# Patient Record
Sex: FEMALE | Race: WHITE | HISPANIC OR LATINO | URBAN - METROPOLITAN AREA
[De-identification: names, ages, dates, MRNs, and addresses within clinical notes are randomized per-mention and may not be internally consistent; named-entity substitution may affect disease eponyms.]

---

## 2021-11-24 ENCOUNTER — EMERGENCY (EMERGENCY)
Facility: HOSPITAL | Age: 46
LOS: 0 days | Discharge: HOME | End: 2021-11-24
Attending: EMERGENCY MEDICINE | Admitting: EMERGENCY MEDICINE
Payer: MEDICAID

## 2021-11-24 VITALS
SYSTOLIC BLOOD PRESSURE: 133 MMHG | TEMPERATURE: 97 F | DIASTOLIC BLOOD PRESSURE: 68 MMHG | HEART RATE: 72 BPM | RESPIRATION RATE: 18 BRPM | OXYGEN SATURATION: 100 %

## 2021-11-24 DIAGNOSIS — K08.89 OTHER SPECIFIED DISORDERS OF TEETH AND SUPPORTING STRUCTURES: ICD-10-CM

## 2021-11-24 PROCEDURE — 99283 EMERGENCY DEPT VISIT LOW MDM: CPT

## 2021-11-24 RX ORDER — AMOXICILLIN 250 MG/5ML
1 SUSPENSION, RECONSTITUTED, ORAL (ML) ORAL
Qty: 21 | Refills: 0
Start: 2021-11-24 | End: 2021-11-30

## 2021-11-24 RX ORDER — IBUPROFEN 200 MG
1 TABLET ORAL
Qty: 42 | Refills: 0
Start: 2021-11-24 | End: 2021-12-07

## 2021-11-24 NOTE — ED PROVIDER NOTE - ATTENDING CONTRIBUTION TO CARE
45 yo F no pmh presents with left dental pain. States that for the last 2 days she has been having worsening left lower molar pain. no fevers. mild left facial swelling. no bleeding or drainage. no chills. Unable to get apt sooner than february so came in for evaluation.     VITALS:  I have reviewed the initial vital signs.  GENERAL: Well-developed, well-nourished, in no acute distress. Nontoxic.  HEENT: MMM, tolerating oral secretions. No trismus. + tenderness to left lower molar. No abscess. No floor of mouth or submandibular swelling. No tongue elevation.    NECK: supple w FROM.   PULM: Normal effort. No tachypnea or retractions. No stridor.   SKIN: Warm, dry.  NEURO: A&Ox3. Speech clear. CN II-XII intact. No focal deficits.

## 2021-11-24 NOTE — ED ADULT TRIAGE NOTE - CHIEF COMPLAINT QUOTE
Patient complaining of left lower tooth pain that started 2 days ago. Today worsened causing headache and ear pain.

## 2021-11-24 NOTE — ED PROVIDER NOTE - ENMT, MLM
Airway patent, Nasal mucosa clear. Mouth with normal mucosa. Throat has no vesicles, no oropharyngeal exudates and uvula is midline.  (+) tenderness to percussion tooth #18

## 2021-11-24 NOTE — ED PROVIDER NOTE - NSFOLLOWUPCLINICS_GEN_ALL_ED_FT
[facility]  Dental  [address] [place] [id number]  Phone: [phone]  Fax:   Follow Up Time: 1-3 Days

## 2021-11-24 NOTE — ED PROVIDER NOTE - CLINICAL SUMMARY MEDICAL DECISION MAKING FREE TEXT BOX
Patient presents with dental pain. + tenderness to left lower molar. no abscess. protecting airway. abx and pain medication given. Discharged with dental follow up and return precautions.

## 2021-11-24 NOTE — ED PROVIDER NOTE - PATIENT PORTAL LINK FT
You can access the FollowMyHealth Patient Portal offered by [facility] by registering at the following website: [email or web address]. By joining [employer]’s FollowMyHealth portal, you will also be able to view your health information using other applications (apps) compatible with our system.

## 2021-11-26 ENCOUNTER — EMERGENCY (EMERGENCY)
Facility: HOSPITAL | Age: 46
LOS: 0 days | Discharge: HOME | End: 2021-11-26
Attending: STUDENT IN AN ORGANIZED HEALTH CARE EDUCATION/TRAINING PROGRAM | Admitting: STUDENT IN AN ORGANIZED HEALTH CARE EDUCATION/TRAINING PROGRAM
Payer: MEDICAID

## 2021-11-26 VITALS
WEIGHT: 151.9 LBS | HEART RATE: 62 BPM | TEMPERATURE: 96 F | DIASTOLIC BLOOD PRESSURE: 70 MMHG | RESPIRATION RATE: 16 BRPM | OXYGEN SATURATION: 100 % | SYSTOLIC BLOOD PRESSURE: 126 MMHG

## 2021-11-26 DIAGNOSIS — K02.9 DENTAL CARIES, UNSPECIFIED: ICD-10-CM

## 2021-11-26 DIAGNOSIS — K08.89 OTHER SPECIFIED DISORDERS OF TEETH AND SUPPORTING STRUCTURES: ICD-10-CM

## 2021-11-26 PROCEDURE — 99282 EMERGENCY DEPT VISIT SF MDM: CPT

## 2021-11-26 NOTE — CONSULT NOTE ADULT - SUBJECTIVE AND OBJECTIVE BOX
Patient is a 46y old  Female who presents with a chief complaint of pain on #17    HPI: Patient reports pain started 3 days ago, she presented to the emergency department on 11/24/21 and was prescribed Amoxicillin and Ibuprofen. Patient reports pain on percussion and palpation, denies dysphagia and dyspnea.      PAST MEDICAL & SURGICAL HISTORY:    ( -  ) heart valve replacement  ( -  ) joint replacement  ( -  ) pregnancy    Allergies    No Known Allergies    Intolerances      *SOCIAL HISTORY: ( -  ) Tobacco; ( -  ) ETOH    *Last Dental Visit: Unknown    Vital Signs Last 24 Hrs  T(C): 35.8 (26 Nov 2021 09:06), Max: 35.8 (26 Nov 2021 09:06)  T(F): 96.5 (26 Nov 2021 09:06), Max: 96.5 (26 Nov 2021 09:06)  HR: 62 (26 Nov 2021 09:06) (62 - 62)  BP: 126/70 (26 Nov 2021 09:06) (126/70 - 126/70) BP at 11:15 136/69 P 57 T: 96.8  BP(mean): --  RR: 16 (26 Nov 2021 09:06) (16 - 16)  SpO2: 100% (26 Nov 2021 09:06) (100% - 100%)      EOE:  TMJ (-   ) clicks                     ( -  ) pops                     ( -  ) crepitus             Mandible <<FROM>>             Facial bones and MOM <<grossly intact>>             ( -  ) trismus             ( -  ) lymphadenopathy             ( -  ) swelling             ( -  ) asymmetry             ( -  ) palpation             ( -  ) dyspnea             ( -  ) dysphagia             ( -  ) loss of consciousness    IOE:  <<permanent> dentition: <<grossly intact>>            hard/soft palate:  ( -  ) palatal torus, <<No pathology noted>>           tongue/FOM <<No pathology noted>>           labial/buccal mucosa <<No pathology noted>>           ( +  ) percussion           ( +  ) palpation           ( -  ) swelling            ( -  ) abscess           ( -  ) sinus tract      *DENTAL RADIOGRAPHS: 1 periapical      *ASSESSMENT: extensive periapical pathology noted on #17, #17 has class II mobility and is depressible in the socket. Diagnosis: necrotic pulp with symptomatic apical periodontitis.      *PLAN: Extraction of #17    PROCEDURE:   Verbal and written consent given. Treatment consequences explained to the patient as per OS sheet dated 7/13/00, : Sean ID: [id number]. Consent obtained, site side signed.   Anesthesia: <<1 carpule of 2% lidocaine with 1:100,000 epinephrine administered via inferior alveolar nerve block, 2 carpules of 4% Septocaine with 1:100,000 epinephrine administered via local infiltration.      >>   Treatment: <<#17 was elevated and extracted, socket curetted and irrigated with saline. Hemostasis achieved, post operative radiograph taken, post operative instructions given verbally and written. Advised the patient to continue taking the prescribed Amoxicillin.    >>     RECOMMENDATIONS:  1) Continue Amoxicillin and ibuprofen  2) Dental F/U with outpatient dentist for comprehensive dental care.   3) If any difficulty swallowing/breathing, fever occur, return to ER.     Resident Name, pager # Ezequiel Nino DDS, #[phone]

## 2021-11-26 NOTE — ED PROVIDER NOTE - PHYSICAL EXAMINATION
Vital Signs: Reviewed  GEN: alert, NAD, speaks full sentences  HEAD:  normocephalic, atraumatic  EYES:  PERRLA; conjunctivae without injection, drainage or discharge  ENMT:  tooth #18 w/ caries, no apical abscess, no gingival edema/erythema, no tongue/uvula/floor of mouth swelling; nasal mucosa moist; mouth moist without ulcerations or lesions; throat moist without erythema, exudate, ulcerations or lesions  NECK:  supple  CARDIAC:  regular rate, normal S1 and S2, no murmurs  RESP:  respiratory rate and effort appear normal for age; lungs are clear to auscultation bilaterally; no rales or wheezes  ABDOMEN:  soft, nontender, nondistended  MUSCULOSKELETAL/NEURO:  normal movement, normal tone  SKIN:  normal skin color for age and race, well-perfused; warm and dry

## 2021-11-26 NOTE — ED PROVIDER NOTE - OBJECTIVE STATEMENT
46yF no pmhx c/o tooth pain x3days; constant, stable; states pain started w/o known provoking factor, exacerbated by eating but is tolerating food/water/secretions, denies fever/chills/abd pain/n/v/d.

## 2022-01-14 ENCOUNTER — EMERGENCY (EMERGENCY)
Facility: HOSPITAL | Age: 47
LOS: 0 days | Discharge: HOME | End: 2022-01-14
Attending: EMERGENCY MEDICINE | Admitting: EMERGENCY MEDICINE
Payer: MEDICAID

## 2022-01-14 VITALS
TEMPERATURE: 97 F | SYSTOLIC BLOOD PRESSURE: 112 MMHG | HEART RATE: 71 BPM | OXYGEN SATURATION: 100 % | DIASTOLIC BLOOD PRESSURE: 56 MMHG | RESPIRATION RATE: 18 BRPM

## 2022-01-14 DIAGNOSIS — K08.89 OTHER SPECIFIED DISORDERS OF TEETH AND SUPPORTING STRUCTURES: ICD-10-CM

## 2022-01-14 DIAGNOSIS — K12.2 CELLULITIS AND ABSCESS OF MOUTH: ICD-10-CM

## 2022-01-14 DIAGNOSIS — K03.81 CRACKED TOOTH: ICD-10-CM

## 2022-01-14 PROBLEM — Z78.9 OTHER SPECIFIED HEALTH STATUS: Chronic | Status: ACTIVE | Noted: 2021-11-26

## 2022-01-14 PROCEDURE — 99282 EMERGENCY DEPT VISIT SF MDM: CPT

## 2022-01-14 NOTE — ED PROVIDER NOTE - PHYSICAL EXAMINATION
CONSTITUTIONAL: Well-appearing; well-nourished; in no apparent distress.   ENT: normal nose; no rhinorrhea; normal pharynx with no tonsillar hypertrophy. dental caries

## 2022-01-14 NOTE — ED PROVIDER NOTE - CLINICAL SUMMARY MEDICAL DECISION MAKING FREE TEXT BOX
Patient presenting with dentalgia airway intact no fevers no chills no drainage [facility]

## 2022-01-14 NOTE — CONSULT NOTE ADULT - SUBJECTIVE AND OBJECTIVE BOX
Patient is a 46y old  Female who presents with a chief complaint of having fractured tooth    HPI:      PAST MEDICAL & SURGICAL HISTORY:  No pertinent past medical history      (   ) heart valve replacement  (   ) joint replacement  (   ) pregnancy    MEDICATIONS  (STANDING):    MEDICATIONS  (PRN):      Allergies    No Known Allergies    Intolerances        FAMILY HISTORY:      *SOCIAL HISTORY: (   ) Tobacco; (   ) ETOH    *Last Dental Visit:    Vital Signs Last 24 Hrs  T(C): 36.1 (14 Jan 2022 15:15), Max: 36.1 (14 Jan 2022 15:15)  T(F): 97 (14 Jan 2022 15:15), Max: 97 (14 Jan 2022 15:15)  HR: 71 (14 Jan 2022 15:15) (71 - 71)  BP: 112/56 (14 Jan 2022 15:15) (112/56 - 112/56)  BP(mean): --  RR: 18 (14 Jan 2022 15:15) (18 - 18)  SpO2: 100% (14 Jan 2022 15:15) (100% - 100%)    LABS:                  EOE:  TMJ ( -  ) clicks                     ( -  ) pops                     ( -  ) crepitus             Mandible <<FROM>>             Facial bones and MOM <<grossly intact>>             ( -  ) trismus             ( -  ) lymphadenopathy             ( -  ) swelling             ( -  ) asymmetry             ( -  ) palpation             ( -  ) dyspnea             ( -  ) dysphagia             ( -  ) loss of consciousness    IOE:  <<permanent>> dentition: <<multiple carious teeth>>           hard/soft palate:  ( -  ) palatal torus, <<No pathology noted>>           tongue/FOM <<No pathology noted>>           labial/buccal mucosa <<No pathology noted>>           ( +  ) percussion           ( +  ) palpation           ( +  ) swelling            ( +  ) abscess           ( -  ) sinus tract    Dentition present: <<   >>  Mobility: <<  >>  Caries: <<   >>         *DENTAL RADIOGRAPHS: Periapical radiograph    RADIOLOGY & ADDITIONAL STUDIES:    *ASSESSMENT: #14 is fractured mesiobuccally. abscess detected on the buccal aspect of #14. Tooth is not restorable.      *PLAN: Risks and benefits of #14 extraction was discussed as per OS sheet dated 7/13/00. Consent and side site signed. Anesthetized with 1 carpule 4% Septocaine, 1:100,000 Epinephrine via infiltration in the area of #14. The tooth was extracted non surgically without any complications. Curettaged and irrigated copiously with saline. Hemostasis was achieved. Post-op periapical radiograph was taken with negative findings. Post-op instructions were given verbally and written. Patient dismissed stable.         RECOMMENDATIONS:  1) <<1- Amoxicillin 500mg, q8h x 7 days  2- Ibuprofen 600mg, q6h x 5days taken with meal  3- No spitting and smoking  4- Rinsing with saline 12 hours post extraction  5- Soft diet >>  2) Dental F/U with outpatient dentist for comprehensive dental care.   3) If any difficulty swallowing/breathing, fever occur, return to ER.     Chris Lindsay, BRIDGET   pager #[phone]

## 2022-01-14 NOTE — ED PROVIDER NOTE - OBJECTIVE STATEMENT
pt presents to ED c/o toothache. Denies fever/chill/HA/dizziness/chest pain/palpitation/sob/abd pain/n/v/d/ black stool/bloody stool/urinary sxs

## 2022-08-21 ENCOUNTER — EMERGENCY (EMERGENCY)
Facility: HOSPITAL | Age: 47
LOS: 0 days | Discharge: HOME | End: 2022-08-21
Attending: STUDENT IN AN ORGANIZED HEALTH CARE EDUCATION/TRAINING PROGRAM | Admitting: STUDENT IN AN ORGANIZED HEALTH CARE EDUCATION/TRAINING PROGRAM
Payer: MEDICAID

## 2022-08-21 VITALS
RESPIRATION RATE: 17 BRPM | WEIGHT: 132.06 LBS | OXYGEN SATURATION: 100 % | DIASTOLIC BLOOD PRESSURE: 81 MMHG | SYSTOLIC BLOOD PRESSURE: 136 MMHG | HEART RATE: 73 BPM | TEMPERATURE: 98 F

## 2022-08-21 VITALS
RESPIRATION RATE: 18 BRPM | DIASTOLIC BLOOD PRESSURE: 76 MMHG | TEMPERATURE: 98 F | HEART RATE: 75 BPM | OXYGEN SATURATION: 100 % | SYSTOLIC BLOOD PRESSURE: 132 MMHG

## 2022-08-21 DIAGNOSIS — R07.81 PLEURODYNIA: ICD-10-CM

## 2022-08-21 DIAGNOSIS — R05.9 COUGH, UNSPECIFIED: ICD-10-CM

## 2022-08-21 DIAGNOSIS — J02.9 ACUTE PHARYNGITIS, UNSPECIFIED: ICD-10-CM

## 2022-08-21 DIAGNOSIS — R07.2 PRECORDIAL PAIN: ICD-10-CM

## 2022-08-21 DIAGNOSIS — Z20.822 CONTACT WITH AND (SUSPECTED) EXPOSURE TO COVID-19: ICD-10-CM

## 2022-08-21 DIAGNOSIS — R07.89 OTHER CHEST PAIN: ICD-10-CM

## 2022-08-21 DIAGNOSIS — R09.81 NASAL CONGESTION: ICD-10-CM

## 2022-08-21 DIAGNOSIS — R06.02 SHORTNESS OF BREATH: ICD-10-CM

## 2022-08-21 LAB
RAPID RVP RESULT: SIGNIFICANT CHANGE UP
SARS-COV-2 RNA SPEC QL NAA+PROBE: SIGNIFICANT CHANGE UP

## 2022-08-21 PROCEDURE — 99284 EMERGENCY DEPT VISIT MOD MDM: CPT

## 2022-08-21 PROCEDURE — 71046 X-RAY EXAM CHEST 2 VIEWS: CPT | Mod: 26

## 2022-08-21 PROCEDURE — 93010 ELECTROCARDIOGRAM REPORT: CPT

## 2022-08-21 RX ORDER — ALBUTEROL 90 UG/1
1 AEROSOL, METERED ORAL ONCE
Refills: 0 | Status: COMPLETED | OUTPATIENT
Start: 2022-08-21 | End: 2022-08-21

## 2022-08-21 RX ORDER — GUAIFENESIN/PSEUDOEPHEDRNE HCL 1200-120MG
1 TABLET, EXTENDED RELEASE 12 HR ORAL
Qty: 10 | Refills: 0
Start: 2022-08-21 | End: 2022-08-25

## 2022-08-21 RX ADMIN — ALBUTEROL 1 PUFF(S): 90 AEROSOL, METERED ORAL at 15:28

## 2022-08-21 NOTE — ED PROVIDER NOTE - PHYSICAL EXAMINATION
Physical Exam    Vital Signs: I have reviewed the initial vital signs.  Constitutional: well-nourished, appears stated age, no acute distress  Eyes: Conjunctiva pink, Sclera clear  ENT: Oropharynx is clear without lesions. uvula midline. no tonsillar erythema, edema, or exudates. no stridor. no pta. floor of the mouth is soft without pus.   Cardiovascular: S1 and S2, regular rate, regular rhythm, well-perfused extremities, radial pulses equal and 2+ b/l.   Respiratory: unlabored respiratory effort, clear to auscultation bilaterally no wheezing, rales and rhonchi. pt is speaking full sentences. no accessory muscle use.   Gastrointestinal: soft, non-tender, nondistended abdomen, no pulsatile mass, normal bowl sounds, no rebound, no guarding  Musculoskeletal: supple neck, no lower extremity edema, no calf tenderness  Integumentary: warm, dry, no rash  Neurologic: awake, alert, cranial nerves II-XII grossly intact, extremities’ motor and sensory functions grossly intact. steady gait.   Psychiatric: appropriate mood, appropriate affect

## 2022-08-21 NOTE — ED PROVIDER NOTE - CARE PLAN
Principal Discharge DX:	Cough  Secondary Diagnosis:	Nasal congestion  Secondary Diagnosis:	Chest pain  Secondary Diagnosis:	SOB (shortness of breath)   1

## 2022-08-21 NOTE — ED PROVIDER NOTE - NS ED ROS FT
CONST: No fever, chills or bodyaches  EYES: No pain, redness, drainage or visual changes.  ENT: No ear pain or discharge, (+) nasal discharge and congestion. (+) sore throat  CARD: No chest pain, palpitations  RESP: No SOB, cough, hemoptysis. No hx of asthma or COPD  GI: No abdominal pain, N/V/D  : No urinary symptoms  MS: No joint pain, back pain or extremity pain/injury  SKIN: No rashes  NEURO: No headache, dizziness, paresthesias or LOC

## 2022-08-21 NOTE — ED PROVIDER NOTE - PATIENT PORTAL LINK FT
You can access the FollowMyHealth Patient Portal offered by [facility] by registering at the following website: [email or web address]. By joining [employer]’s FollowMyHealth portal, you will also be able to view your health information using other applications (apps) compatible with our system.

## 2022-08-21 NOTE — ED PROVIDER NOTE - NSFOLLOWUPINSTRUCTIONS_ED_ALL_ED_FT
Cough    Coughing is a reflex that clears your throat and your airways. Coughing helps to heal and protect your lungs. It is normal to cough occasionally, but a cough that happens with other symptoms or lasts a long time may be a sign of a condition that needs treatment. Coughing may be caused by infections, asthma or COPD, smoking, postnasal drip, gastroesophageal reflux, as well as other medical conditions. Take medicines only as instructed by your health care provider. Avoid environments or triggers that causes you to cough at work or at home.    SEEK IMMEDIATE MEDICAL CARE IF YOU HAVE ANY OF THE FOLLOWING SYMPTOMS: coughing up blood, shortness of breath, rapid heart rate, chest pain, unexplained weight loss or night sweats.    Rhinosinusitis (Nasal Congestion)    WHAT YOU NEED TO KNOW:    Rhinosinusitis (RS) is inflammation or infection of your nasal passages and sinuses. RS is most often caused by a virus but may be caused by bacteria. Acute RS lasts up to 12 weeks. Chronic RS lasts more than 12 weeks. Recurrent RS means you have 4 or more infections in 1 year.    DISCHARGE INSTRUCTIONS:    Return to the emergency department if:   •You have trouble breathing, or wheezing that is getting worse.  •You have a stiff neck, a fever, or a bad headache.  •You cannot open your eye.  •Your eyeball bulges out, or you cannot move your eye.  •You are more sleepy than usual, or you notice changes in your ability to think, move, or talk.  •You have swelling of your forehead or scalp.    Call your doctor if:   •You have vision changes, such as double vision.  •Your eye and eyelid are red, swollen, and painful.  •Your symptoms do not improve after 10 days.  •You have nausea and are vomiting.  •Your nose is bleeding.  •You have questions or concerns about your condition or care.    Medicines: Your symptoms may go away on their own. Your healthcare provider may recommend watchful waiting for up to 10 days before starting antibiotics. Antibiotics will not help if the infection is caused by a virus. Check with your provider before you take any over-the-counter medicines. You may need any of the following:  •Acetaminophen decreases pain and fever. It is available without a doctor's order. Ask how much to take and how often to take it. Follow directions. Read the labels of all other medicines you are using to see if they also contain acetaminophen, or ask your doctor or pharmacist. Acetaminophen can cause liver damage if not taken correctly. Do not use more than 4 grams (4,000 milligrams) total of acetaminophen in one day.   •NSAIDs, such as ibuprofen, help decrease swelling, pain, and fever. This medicine is available with or without a doctor's order. NSAIDs can cause stomach bleeding or kidney problems in certain people. If you take blood thinner medicine, always ask your healthcare provider if NSAIDs are safe for you. Always read the medicine label and follow directions.  •Nasal steroid sprays help decrease inflammation in your nose and sinuses.  •Decongestants help reduce swelling and drain mucus in the nose and sinuses. They may help you breathe easier. Do not use decongestants for more than 3 days.  •Antihistamines help dry mucus in the nose and relieve sneezing.  •Antibiotics help treat or prevent a bacterial infection.    Self-care:   •Rinse your sinuses as directed. Use a sinus rinse device to rinse your nasal passages with a saline (salt water) solution or distilled water. Do not use tap water. A sinus rinse will help thin the mucus in your nose and rinse away pollen and dirt. It will also help lower swelling so you can breathe normally.  •Use a humidifier to increase air moisture in your home. Moist air may make it easier for you to breathe and help decrease your cough.  •Sleep with your head raised. Place an extra pillow under your head before you go to sleep to help your sinuses drain.  •Drink liquids as directed. Ask your healthcare provider how much liquid to drink each day and which liquids are best for you. Liquids will thin the mucus in your nose and help it drain. Do not have drinks that contain alcohol or caffeine.  •Do not smoke, and avoid secondhand smoke. Nicotine and other chemicals in cigarettes and cigars can make your symptoms worse. Ask your healthcare provider for information if you currently smoke and need help to quit. E-cigarettes or smokeless tobacco still contain nicotine. Talk to your healthcare provider before you use these products.      Prevent the spread of germs:   •Wash your hands often with soap and water. Wash your hands after you use the bathroom, change a child's diaper, or sneeze. Wash your hands before you prepare or eat food.  •Stay away from people who are sick. Some germs spread easily and quickly through contact.  Follow up with your doctor as directed: You may be referred to an ear, nose, and throat specialist. Write down your questions so you remember to ask them during your visits.    Chest Pain    Chest pain can be caused by many different conditions which may or may not be dangerous. Causes include heartburn, lung infections, heart attack, blood clot in lungs, skin infections, strain or damage to muscle, cartilage, or bones, etc. In addition to a history and physical examination, an electrocardiogram (ECG) or other lab tests may have been performed to determine the cause of your chest pain. Follow up with your primary care provider or with a cardiologist as instructed.     SEEK IMMEDIATE MEDICAL CARE IF YOU HAVE ANY OF THE FOLLOWING SYMPTOMS: worsening chest pain, coughing up blood, unexplained back/neck/jaw pain, severe abdominal pain, dizziness or lightheadedness, fainting, shortness of breath, sweaty or clammy skin, vomiting, or racing heart beat. These symptoms may represent a serious problem that is an emergency. Do not wait to see if the symptoms will go away. Get medical help right away. Call 911 and do not drive yourself to the hospital.    Shortness of breath    Shortness of breath (dyspnea) means you have trouble breathing and could indicate a medical problem. Causes include lung disease, heart disease, low amount of red blood cells (anemia), poor physical fitness, being overweight, smoking, etc. Your health care provider today may not be able to find a cause for your shortness of breath after your exam. In this case, it is important to have a follow-up exam with your primary care physician as instructed. If medicines were prescribed, take them as directed for the full length of time directed. Refrain from tobacco products.    SEEK IMMEDIATE MEDICAL CARE IF YOU HAVE ANY OF THE FOLLOWING SYMPTOMS: worsening shortness of breath, chest pain, back pain, abdominal pain, fever, coughing up blood, lightheadedness/dizziness.

## 2022-08-21 NOTE — ED PROVIDER NOTE - NS ED ATTENDING STATEMENT MOD
This was a shared visit with the AFSANEH. I reviewed and verified the documentation and independently performed the documented:

## 2022-08-21 NOTE — ED PROVIDER NOTE - OBJECTIVE STATEMENT
48 y/o female with no significant PMH presents to the ED for evaluation of nasal congestion, sore throat, and chest tightness x 5 days. pt denies sob, headache, dizziness, rashes, abdominal pain, n/v/d/c, fever, chills, recent travel, recent trauma, recent sick contacts, recent surgeries, recent hospitalizations, or use of hormones.

## 2022-08-21 NOTE — ED PROVIDER NOTE - CLINICAL SUMMARY MEDICAL DECISION MAKING FREE TEXT BOX
47-year-old female presents today with URI symptoms.  Patient is hemodynamically stable and well-appearing on evaluation.  Patient reported having substernal pleuritic chest pain secondary to coughing.  She has reproducible chest wall tenderness palpation.  Patient's EKG, troponin and chest x-ray are all unremarkable.  Patient will be treated as a URI discharged to follow-up with PMD.  Return precautions provided.

## 2022-11-08 NOTE — ED ADULT NURSE NOTE - CAS DISCH BELONGINGS RETURNED
CHIEF COMPLAINT:    Chief Complaint   Patient presents with   • Physical     CPE.  Discuss lab drawn 10/9/22.  Pt would like to discuss frequent headaches and difficulty sleeping.   Pt is having trouble both falling and staying asleep and feels tired all day long.  Pt does snore. No witnessed apnea.   Discuss new script for ED. Generic Sidenafil is not effective.  Would like to have Norco refilled, uses prn for foot pain.          HISTORY OF PRESENT ILLNESS: Mr. Da Silva is a 55-year-old male presenting for annual physical and management of chronic health concerns :    Benign essential HTN:  Reviewed intake blood pressure above goal.  He denies interim chest pain, shortness a breath or increased lower extremity edema.    Erectile dysfunction, unspecified erectile dysfunction type:  He feels that change to generic sildenafil was ineffective.  He wishes to try Cialis.  Reviewed is covered by his insurance.    Mixed hyperlipidemia:  History of elevated cholesterol, currently on dietary lifestyle management.  No known history of cardiovascular disease.    Chronic pain in right foot:  PDMP reviewed.  Rare use of hydrocodone, less than 30 tablets per year used when OTC analgesic ineffective.  History of musculoskeletal pain, previous trauma with femur fracture, leg-length discrepancy, chronic back and neck pain.    Sleep issues: per above, difficulty with sleep initiation as well as maintenance.      Need for vaccination: Reviewed and discussed recommended vaccinations, declines COVID-19 or influenza vaccine, received 1st dose of Shingrix.          PAST MEDICAL HISTORY  Past Medical History:   Diagnosis Date   • Erectile dysfunction    • Malignant neoplasm (CMS/HCC)     BCC right side of neck        PAST SURGICAL HISTORY  Past Surgical History:   Procedure Laterality Date   • Colonoscopy  9/8/15    5 yr f/u   • Colonoscopy  01/29/2021    [facility]: 5y FU for hx polyps   • Colonoscopy diagnostic  09/04/2012   • Color can  scrn,fecal occult blood  08/07/2012   • Fracture surgery  1984    left leg,       MEDICATIONS  Current Outpatient Medications   Medication Sig Dispense Refill   • losartan (COZAAR) 50 MG tablet Take 1 tablet by mouth daily. 90 tablet 3   • HYDROcodone-acetaminophen (NORCO) 5-325 MG per tablet 1 tablet daily as needed for pain. 30 tablet 0   • tadalafil (CIALIS) 10 MG tablet Take 1 tablet by mouth as needed for Erectile Dysfunction. 10 tablet 11   • sildenafil (VIAGRA) 100 MG tablet Take 1/2 to 1 tablet PO daily as needed for sexual function. 20 tablet 3     No current facility-administered medications for this visit.       ALLERGIES:  ALLERGIES:  No Known Allergies    SOCIAL HISTORY  Social History     Socioeconomic History   • Marital status:      Spouse name: Not on file   • Number of children: Not on file   • Years of education: Not on file   • Highest education level: Not on file   Occupational History   • Occupation: yard support     Employer: [employer]   Tobacco Use   • Smoking status: Never Smoker   • Smokeless tobacco: Never Used   Substance and Sexual Activity   • Alcohol use: Yes     Alcohol/week: 2.0 - 3.0 standard drinks     Types: 2 - 3 Standard drinks or equivalent per week   • Drug use: No   • Sexual activity: Not on file   Other Topics Concern   • Not on file   Social History Narrative   • Not on file     Social Determinants of Health     Financial Resource Strain: Not on file   Food Insecurity: Not on file   Transportation Needs: Not on file   Physical Activity: Not on file   Stress: Not on file   Social Connections: Not on file   Intimate Partner Violence: Not At Risk   • Social Determinants: Intimate Partner Violence Past Fear: No   • Social Determinants: Intimate Partner Violence Current Fear: No       FAMILY HISTORY  Family History   Problem Relation Age of Onset   • Cancer Mother 37        breast   • Stroke Father    • Myocardial Infarction Father    • Cancer Maternal Grandmother          colon         REVIEW OF SYSTEMS:    CONSTITUTION:  No complaints of weight loss, weight gain, fever, chills, hot flashes, night sweats or malaise.  SKIN:  No complaints of rash, pruritis, color changes, abnormal nevi or dryness.  EYE:  No complaints of blurry vision, double vision, itchy eyes, dry eyes, glaucoma, cataracts or eye pain.  No eye discharge.  ENT:  No complaints of ear pain, ear drainage, hearing loss, deafness, tinnitus, balance difficulties, stuffy nose, rhinorrhea, epistaxis, loss of smell, bleeding gums, dental problem and TMJ problems.  No oral lesions.  CARDIOVASCULAR:  No complaints of chest pain, chest pressure, heaviness, tightness, squeezing, palpitations, tachycardia, irregular heart beat, fainting, dizziness, lightheadedness, orthopnea, lower extremity edema or claudication.  RESPIRATORY:  No complaints of cough, sputum, paroxysmal nocturnal awakening, shortness of breath, wheezing or dyspnea on exertion.  No TB (tuberculosis) exposure.  GASTROINTESTINAL:  No complaints of poor appetite, solid dysphagia, liquid dysphagia, nausea, vomiting, heartburn, reflux, indigestion, abdominal pain, change in stool pattern, hemorrhoids, rectal itching or pain, melena, hematochezia, constipation or diarrhea.  Bowel movements once or twice daily.  GENITOURINARY:  No complaints of nocturia, urgency, dysuria, frequency, hesitancy, hematuria, retention or incontinence.  MUSCULOSKELETAL:  See history of present illness  NEUROLOGIC:  No complaints of headaches, migraine headaches, paralysis, numbness or tingling of hands or numbness or tingling of feet.  PSYCHIATRY:  No complaints of depression, anxiety, suicidal thoughts, hallucinations.      PHYSICAL EXAMINATION:  Visit Vitals  BP (!) 150/90   Pulse 97   Temp 98.5 °F (36.9 °C) (Temporal)   Resp 16   Ht 5' 10\" (1.778 m)   Wt 105.2 kg (231 lb 14.4 oz)   SpO2 97%   BMI 33.27 kg/m²     General:  no apparent distress.  Skin:  no suspicious lesions, masses or  rashes and scattered benign-appearing nevi on sun-exposed areas.  Head:  normocephalic. No masses, lesions, tenderness or abnormalities  Eyes:  normal, PERRLA, EOM's intact, sclera anicteric and conjunctiva not injected  Ears:  external ears normal to inspection and palpation, turgor normal, canals clear, TM's clear, normal light reflex, able to hear normal conversation  Nose:  nose shows no deformity, asymmetry, or inflammation  Mouth:  Lips, mucosa, and tongue normal. Teeth and gums normal. and Oropharynx clear.  Neck:  supple, no adenopathy, No thyromegaly.  Thyroid normal size, non-tender,  without nodularity, carotid pulses 2+ equal and No carotid bruits.  Lungs:  No respiratory distress, chest symmetric with normal A/P diameter, no chest deformities noted, normal respiratory rate and rhythm, lungs clear to auscultation  Heart:  regular rate and rhythm, S1 normal, S2 normal, no S3, S4, no murmurs, clicks, or gallops  Abdomen:  Benign, Soft, non-tender, No masses, organomegaly, Bowel sounds normoactive  Rectal:  not performed  Extremities:No cyanosis, clubbing and No edema; chronic leg length discrepancy, shortening left lower extremity  Neurologic:  CN II-XII intact. Sensory and motor grossly intact. Reflexes normal and symmetric    Lab Services on 10/09/2022   Component Date Value Ref Range Status   • TSH 10/09/2022 2.625  0.350 - 5.000 mcUnits/mL Final   • Fasting Status 10/09/2022 12  0 - 999 Hours Final   • Sodium 10/09/2022 141  135 - 145 mmol/L Final   • Potassium 10/09/2022 4.4  3.4 - 5.1 mmol/L Final   • Chloride 10/09/2022 102  97 - 110 mmol/L Final   • Carbon Dioxide 10/09/2022 29  21 - 32 mmol/L Final   • Anion Gap 10/09/2022 14  7 - 19 mmol/L Final   • Glucose 10/09/2022 86  70 - 99 mg/dL Final   • BUN 10/09/2022 15  6 - 20 mg/dL Final   • Creatinine 10/09/2022 1.00  0.67 - 1.17 mg/dL Final   • Glomerular Filtration Rate 10/09/2022 89  >=60 Final   • BUN/ Creatinine Ratio 10/09/2022 15  7 - 25 Final    • Calcium 10/09/2022 8.9  8.4 - 10.2 mg/dL Final   • Fasting Status 10/09/2022 12  0 - 999 Hours Final   • Cholesterol 10/09/2022 215 (A) <=199 mg/dL Final   • Triglycerides 10/09/2022 203 (A) <=149 mg/dL Final   • HDL 10/09/2022 45  >=40 mg/dL Final   • LDL 10/09/2022 129  <=129 mg/dL Final   • Non-HDL Cholesterol 10/09/2022 170  mg/dL Final   • Cholesterol/ HDL Ratio 10/09/2022 4.8 (A) <=4.4 Final   • Microalbumin, Urine 10/09/2022 18.30  mg/dL Final   • Creatinine, Urine 10/09/2022 157.00  mg/dL Final   • Microalbumin/ Creatinine Ratio 10/09/2022 116.6 (A) <30.0 mg/g Final   • Prostate Specific Antigen 10/09/2022 1.18  <=3.50 ng/mL Final   • WBC 10/09/2022 10.9  4.2 - 11.0 K/mcL Final   • RBC 10/09/2022 5.37  4.50 - 5.90 mil/mcL Final   • HGB 10/09/2022 17.2 (A) 13.0 - 17.0 g/dL Final   • HCT 10/09/2022 49.6  39.0 - 51.0 % Final   • MCV 10/09/2022 92.4  78.0 - 100.0 fl Final   • MCH 10/09/2022 32.0  26.0 - 34.0 pg Final   • MCHC 10/09/2022 34.7  32.0 - 36.5 g/dL Final   • RDW-CV 10/09/2022 11.7  11.0 - 15.0 % Final   • RDW-SD 10/09/2022 41.0  39.0 - 50.0 fL Final   • PLT 10/09/2022 252  140 - 450 K/mcL Final   • Neutrophil, Percent 10/09/2022 63  % Final   • Lymphocytes, Percent 10/09/2022 26  % Final   • Mono, Percent 10/09/2022 8  % Final   • Eosinophils, Percent 10/09/2022 1  % Final   • Basophils, Percent 10/09/2022 1  % Final   • Immature Granulocytes 10/09/2022 1  % Final   • Absolute Neutrophils 10/09/2022 7.1  1.8 - 7.7 K/mcL Final   • Absolute Lymphocytes 10/09/2022 2.8  1.0 - 4.0 K/mcL Final   • Absolute Monocytes 10/09/2022 0.8  0.3 - 0.9 K/mcL Final   • Absolute Eosinophils  10/09/2022 0.1  0.0 - 0.5 K/mcL Final   • Absolute Basophils 10/09/2022 0.1  0.0 - 0.3 K/mcL Final   • Absolute Immmature Granulocytes 10/09/2022 0.1  0.0 - 0.2 K/mcL Final      The 10-year ASCVD risk score (Charleen PEREA, et al., 2019) is: 10.2%    Values used to calculate the score:      Age: 55 years      Sex: Male      Is  Non- : No      Diabetic: No      Tobacco smoker: No      Systolic Blood Pressure: 150 mmHg      Is BP treated: Yes      HDL Cholesterol: 45 mg/dL      Total Cholesterol: 215 mg/dL      Impression and Plan:  ASSESSMENT:   1. Annual physical exam    2. Benign essential HTN :  Blood pressure above goal, 2 week blood pressure recheck.  Goal blood pressure less than 150/90.   3. Erectile dysfunction, unspecified erectile dysfunction type : Change to Cialis 10 mg daily.  Reviewed side effects.   4. Mixed hyperlipidemia:  Elevated lipids, cardiovascular risk elevated.     5. Chronic pain in right foot :  Chronic right foot pain, multiple areas of musculoskeletal pain.  Continue to p.r.n. use of chiropractic care, OTC analgesics, rare use of hydrocodone.  PDMP reviewed.   6. Need for vaccination :  Agree to shingles vaccine, declined COVID-19 and influenza vaccines.       Orders Placed This Encounter   • Zoster Shingles Recombinant Adjuvanted (Shingrix) 2-Dose Series - [id number]   • losartan (COZAAR) 50 MG tablet   • HYDROcodone-acetaminophen (NORCO) 5-325 MG per tablet   • tadalafil (CIALIS) 10 MG tablet       Keaton Ribeiro MD  Family Medicine  [address] [address]    T [phone]  F [phone]   Not applicable

## 2024-12-15 ENCOUNTER — EMERGENCY (EMERGENCY)
Facility: HOSPITAL | Age: 49
LOS: 0 days | Discharge: HOME | End: 2024-12-16
Attending: EMERGENCY MEDICINE
Payer: MEDICAID

## 2024-12-15 VITALS
RESPIRATION RATE: 18 BRPM | SYSTOLIC BLOOD PRESSURE: 128 MMHG | TEMPERATURE: 98 F | DIASTOLIC BLOOD PRESSURE: 75 MMHG | WEIGHT: 139.99 LBS | OXYGEN SATURATION: 100 % | HEIGHT: 63 IN | HEART RATE: 61 BPM

## 2024-12-15 DIAGNOSIS — R73.03 PREDIABETES: ICD-10-CM

## 2024-12-15 DIAGNOSIS — R07.89 OTHER CHEST PAIN: ICD-10-CM

## 2024-12-15 DIAGNOSIS — R06.02 SHORTNESS OF BREATH: ICD-10-CM

## 2024-12-15 DIAGNOSIS — Z79.84 LONG TERM (CURRENT) USE OF ORAL HYPOGLYCEMIC DRUGS: ICD-10-CM

## 2024-12-15 LAB
ALBUMIN SERPL ELPH-MCNC: 4.3 G/DL — SIGNIFICANT CHANGE UP (ref 3.5–5.2)
ALP SERPL-CCNC: 121 U/L — HIGH (ref 30–115)
ALT FLD-CCNC: 65 U/L — HIGH (ref 0–41)
ANION GAP SERPL CALC-SCNC: 11 MMOL/L — SIGNIFICANT CHANGE UP (ref 7–14)
AST SERPL-CCNC: 50 U/L — HIGH (ref 0–41)
BASOPHILS # BLD AUTO: 0.02 K/UL — SIGNIFICANT CHANGE UP (ref 0–0.2)
BASOPHILS NFR BLD AUTO: 0.3 % — SIGNIFICANT CHANGE UP (ref 0–1)
BILIRUB SERPL-MCNC: 0.2 MG/DL — SIGNIFICANT CHANGE UP (ref 0.2–1.2)
BUN SERPL-MCNC: 14 MG/DL — SIGNIFICANT CHANGE UP (ref 10–20)
CALCIUM SERPL-MCNC: 9.2 MG/DL — SIGNIFICANT CHANGE UP (ref 8.4–10.5)
CHLORIDE SERPL-SCNC: 105 MMOL/L — SIGNIFICANT CHANGE UP (ref 98–110)
CO2 SERPL-SCNC: 25 MMOL/L — SIGNIFICANT CHANGE UP (ref 17–32)
CREAT SERPL-MCNC: 0.5 MG/DL — LOW (ref 0.7–1.5)
D DIMER BLD IA.RAPID-MCNC: 190 NG/ML DDU — SIGNIFICANT CHANGE UP
EGFR: 115 ML/MIN/1.73M2 — SIGNIFICANT CHANGE UP
EOSINOPHIL # BLD AUTO: 0.18 K/UL — SIGNIFICANT CHANGE UP (ref 0–0.7)
EOSINOPHIL NFR BLD AUTO: 2.4 % — SIGNIFICANT CHANGE UP (ref 0–8)
GLUCOSE SERPL-MCNC: 106 MG/DL — HIGH (ref 70–99)
HCG SERPL QL: NEGATIVE — SIGNIFICANT CHANGE UP
HCT VFR BLD CALC: 39.5 % — SIGNIFICANT CHANGE UP (ref 37–47)
HGB BLD-MCNC: 13.3 G/DL — SIGNIFICANT CHANGE UP (ref 12–16)
IMM GRANULOCYTES NFR BLD AUTO: 0.3 % — SIGNIFICANT CHANGE UP (ref 0.1–0.3)
LYMPHOCYTES # BLD AUTO: 2.93 K/UL — SIGNIFICANT CHANGE UP (ref 1.2–3.4)
LYMPHOCYTES # BLD AUTO: 38.5 % — SIGNIFICANT CHANGE UP (ref 20.5–51.1)
MCHC RBC-ENTMCNC: 30.2 PG — SIGNIFICANT CHANGE UP (ref 27–31)
MCHC RBC-ENTMCNC: 33.7 G/DL — SIGNIFICANT CHANGE UP (ref 32–37)
MCV RBC AUTO: 89.8 FL — SIGNIFICANT CHANGE UP (ref 81–99)
MONOCYTES # BLD AUTO: 0.7 K/UL — HIGH (ref 0.1–0.6)
MONOCYTES NFR BLD AUTO: 9.2 % — SIGNIFICANT CHANGE UP (ref 1.7–9.3)
NEUTROPHILS # BLD AUTO: 3.77 K/UL — SIGNIFICANT CHANGE UP (ref 1.4–6.5)
NEUTROPHILS NFR BLD AUTO: 49.3 % — SIGNIFICANT CHANGE UP (ref 42.2–75.2)
NRBC # BLD: 0 /100 WBCS — SIGNIFICANT CHANGE UP (ref 0–0)
NT-PROBNP SERPL-SCNC: 94 PG/ML — SIGNIFICANT CHANGE UP (ref 0–300)
PLATELET # BLD AUTO: 282 K/UL — SIGNIFICANT CHANGE UP (ref 130–400)
PMV BLD: 12.1 FL — HIGH (ref 7.4–10.4)
POTASSIUM SERPL-MCNC: 4.2 MMOL/L — SIGNIFICANT CHANGE UP (ref 3.5–5)
POTASSIUM SERPL-SCNC: 4.2 MMOL/L — SIGNIFICANT CHANGE UP (ref 3.5–5)
PROT SERPL-MCNC: 6.7 G/DL — SIGNIFICANT CHANGE UP (ref 6–8)
RBC # BLD: 4.4 M/UL — SIGNIFICANT CHANGE UP (ref 4.2–5.4)
RBC # FLD: 13.4 % — SIGNIFICANT CHANGE UP (ref 11.5–14.5)
SODIUM SERPL-SCNC: 141 MMOL/L — SIGNIFICANT CHANGE UP (ref 135–146)
TROPONIN T, HIGH SENSITIVITY RESULT: <6 NG/L — SIGNIFICANT CHANGE UP (ref 6–13)
WBC # BLD: 7.62 K/UL — SIGNIFICANT CHANGE UP (ref 4.8–10.8)
WBC # FLD AUTO: 7.62 K/UL — SIGNIFICANT CHANGE UP (ref 4.8–10.8)

## 2024-12-15 PROCEDURE — 99285 EMERGENCY DEPT VISIT HI MDM: CPT | Mod: 25

## 2024-12-15 PROCEDURE — 71046 X-RAY EXAM CHEST 2 VIEWS: CPT

## 2024-12-15 PROCEDURE — 96374 THER/PROPH/DIAG INJ IV PUSH: CPT

## 2024-12-15 PROCEDURE — 71046 X-RAY EXAM CHEST 2 VIEWS: CPT | Mod: 26

## 2024-12-15 PROCEDURE — 84484 ASSAY OF TROPONIN QUANT: CPT

## 2024-12-15 PROCEDURE — 36415 COLL VENOUS BLD VENIPUNCTURE: CPT

## 2024-12-15 PROCEDURE — 83880 ASSAY OF NATRIURETIC PEPTIDE: CPT

## 2024-12-15 PROCEDURE — 84703 CHORIONIC GONADOTROPIN ASSAY: CPT

## 2024-12-15 PROCEDURE — 80053 COMPREHEN METABOLIC PANEL: CPT

## 2024-12-15 PROCEDURE — 85025 COMPLETE CBC W/AUTO DIFF WBC: CPT

## 2024-12-15 PROCEDURE — 99285 EMERGENCY DEPT VISIT HI MDM: CPT

## 2024-12-15 PROCEDURE — 93010 ELECTROCARDIOGRAM REPORT: CPT

## 2024-12-15 PROCEDURE — 93005 ELECTROCARDIOGRAM TRACING: CPT

## 2024-12-15 PROCEDURE — 85379 FIBRIN DEGRADATION QUANT: CPT

## 2024-12-15 RX ORDER — GUAIFENESIN 400 MG
200 TABLET ORAL ONCE
Refills: 0 | Status: DISCONTINUED | OUTPATIENT
Start: 2024-12-15 | End: 2024-12-16

## 2024-12-15 RX ORDER — DEXAMETHASONE 1.5 MG/1
10 TABLET ORAL ONCE
Refills: 0 | Status: COMPLETED | OUTPATIENT
Start: 2024-12-15 | End: 2024-12-15

## 2024-12-15 RX ORDER — KETOROLAC TROMETHAMINE 30 MG/ML
15 INJECTION INTRAMUSCULAR; INTRAVENOUS ONCE
Refills: 0 | Status: DISCONTINUED | OUTPATIENT
Start: 2024-12-15 | End: 2024-12-15

## 2024-12-15 RX ORDER — GUAIFENESIN/DEXTROMETHORPHAN 100-10MG/5
200 SYRUP ORAL ONCE
Refills: 0 | Status: DISCONTINUED | OUTPATIENT
Start: 2024-12-15 | End: 2024-12-15

## 2024-12-15 RX ADMIN — KETOROLAC TROMETHAMINE 15 MILLIGRAM(S): 30 INJECTION INTRAMUSCULAR; INTRAVENOUS at 20:56

## 2024-12-15 RX ADMIN — DEXAMETHASONE 10 MILLIGRAM(S): 1.5 TABLET ORAL at 20:56

## 2024-12-15 NOTE — ED PROVIDER NOTE - EKG/XRAY ADDITIONAL INFORMATION
EKG reviewed by me Dr. Jin and shows, sinus rhythm, TN/QRS/QTC intervals are in acceptable range, no significant ST/T wave changes noted.  Chest X-rays reviewed and interpreted by me Dr. Jin and shows no actue findings. No Pneumothorax, no free air, no effusions, and these findings discussed with patient.

## 2024-12-15 NOTE — ED ADULT TRIAGE NOTE - TEMPERATURE IN CELSIUS (DEGREES C)
36.8 Arava Counseling:  Patient counseled regarding adverse effects of Arava including but not limited to nausea, vomiting, abnormalities in liver function tests. Patients may develop mouth sores, rash, diarrhea, and abnormalities in blood counts. The patient understands that monitoring is required including LFTs and blood counts.  There is a rare possibility of scarring of the liver and lung problems that can occur when taking methotrexate. Persistent nausea, loss of appetite, pale stools, dark urine, cough, and shortness of breath should be reported immediately. Patient advised to discontinue Arava treatment and consult with a physician prior to attempting conception. The patient will have to undergo a treatment to eliminate Arava from the body prior to conception.

## 2024-12-15 NOTE — ED PROVIDER NOTE - NSFOLLOWUPINSTRUCTIONS_ED_ALL_ED_FT
Our Emergency Department Referral Coordinators will be reaching out to you in the next 24-48 hours from 9:00am to 5:00pm to schedule a follow up appointment. Please expect a phone call from the hospital in that time frame. If you do not receive a call or if you have any questions or concerns, you can reach them at   [phone].    Chest Pain    Chest pain can be caused by many different conditions which may or may not be dangerous. Causes include heartburn, lung infections, heart attack, blood clot in lungs, skin infections, strain or damage to muscle, cartilage, or bones, etc. In addition to a history and physical examination, an electrocardiogram (ECG) or other lab tests may have been performed to determine the cause of your chest pain. Follow up with your primary care provider or with a cardiologist as instructed.     SEEK IMMEDIATE MEDICAL CARE IF YOU HAVE ANY OF THE FOLLOWING SYMPTOMS: worsening chest pain, coughing up blood, unexplained back/neck/jaw pain, severe abdominal pain, dizziness or lightheadedness, fainting, shortness of breath, sweaty or clammy skin, vomiting, or racing heart beat. These symptoms may represent a serious problem that is an emergency. Do not wait to see if the symptoms will go away. Get medical help right away. Call 911 and do not drive yourself to the hospital.    Shortness of breath    Shortness of breath (dyspnea) means you have trouble breathing and could indicate a medical problem. Causes include lung disease, heart disease, low amount of red blood cells (anemia), poor physical fitness, being overweight, smoking, etc. Your health care provider today may not be able to find a cause for your shortness of breath after your exam. In this case, it is important to have a follow-up exam with your primary care physician as instructed. If medicines were prescribed, take them as directed for the full length of time directed. Refrain from tobacco products.    SEEK IMMEDIATE MEDICAL CARE IF YOU HAVE ANY OF THE FOLLOWING SYMPTOMS: worsening shortness of breath, chest pain, back pain, abdominal pain, fever, coughing up blood, lightheadedness/dizziness.

## 2024-12-15 NOTE — ED PROVIDER NOTE - CARE PROVIDER_API CALL
Efrain English Y  Interventional Cardiology  [address] [place] [address], [address]  [place] [address]  Phone: [phone]  Fax: [phone]  Follow Up Time: 7-10 Days    your primary care doctor dr. griffith,   Phone: (   )    -  Fax: (   )    -  Follow Up Time: 7-10 Days

## 2024-12-15 NOTE — ED PROVIDER NOTE - PATIENT PORTAL LINK FT
You can access the FollowMyHealth Patient Portal offered by [facility] by registering at the following website: [email or web address]. By joining [facility]’s FollowMyHealth portal, you will also be able to view your health information using other applications (apps) compatible with our system.

## 2024-12-15 NOTE — ED ADULT TRIAGE NOTE - CHIEF COMPLAINT QUOTE
She says she has been having chest pain, she finds it had to breathe at times to catch her breath - daughter

## 2024-12-15 NOTE — ED PROVIDER NOTE - ATTENDING APP SHARED VISIT CONTRIBUTION OF CARE
I have personally performed a history and physical exam on this patient. I have personally directed the management of the patient.  Patient presents to ED for evaluation of chest tightness/sob x one week.   Vitals reviewed.   Patient is awake, alert, answering questions appropriately, appears comfortable and not in any distress.  Lungs: CTA, no wheezing, no crackles.  Heart: s1, s2, rrr, no M/G.

## 2024-12-15 NOTE — ED ADULT NURSE REASSESSMENT NOTE - NS ED NURSE REASSESS COMMENT FT1
Pt currently a/o x4, c/o chest pain x1 week. Pt denies any interventions sts also cough and flu like symptoms. Family at bedside.

## 2024-12-15 NOTE — ED PROVIDER NOTE - DIFFERENTIAL DIAGNOSIS
Electrolyte abnormalities, dehydration, NIEVES, hyperglycemia, hypoglycemia, symptomatic anemia.  r/o cardiomegaly, r/o cardiac arrhythmia. Differential Diagnosis

## 2024-12-15 NOTE — ED PROVIDER NOTE - PROGRESS NOTE DETAILS
Ariadna Juarez, patient daughter, 21 yrs old. FF: I discussed lab and radiology results with pt. pt made aware of elevated LFTs and advised to have them rp outpt for pcp. pt reports she is feeling better. pt advised of strict return precautions discussed at bedside. agreeable to dc. f/u with pcp and cardio.

## 2024-12-15 NOTE — ED ADULT NURSE NOTE - OBJECTIVE STATEMENT
Pt c/o generalized chest pain, cough and intermittent episodes of SOB   denies hemoptysis   no s/s resp distress

## 2024-12-15 NOTE — ED ADULT NURSE NOTE - PAIN: PRESENCE, MLM
Called to inform patient of the need to reschedule 11/10/22  appointment, due to a change in the provider's schedule.  Patient verbalizes understanding.  Appointment rescheduled to 1/3/23.   complains of pain/discomfort

## 2024-12-15 NOTE — ED PROVIDER NOTE - PROVIDER TOKENS
PROVIDER:[TOKEN:[[id number]],FOLLOWUP:[7-10 Days]],FREE:[LAST:[your primary care doctor dr. griffith],PHONE:[(   )    -],FAX:[(   )    -],FOLLOWUP:[7-10 Days]]

## 2024-12-15 NOTE — ED PROVIDER NOTE - PHYSICAL EXAMINATION
Physical Exam    Vital Signs: I have reviewed the initial vital signs.  Constitutional: well-nourished, appears stated age, no acute distress  Eyes: Conjunctiva pink, Sclera clear  Cardiovascular: regular rate, regular rhythm, well-perfused extremities, radial pulses equal and 2+ b/l.   Respiratory: unlabored respiratory effort, clear to auscultation bilaterally no wheezing, rales and rhonchi. pt is speaking full sentences. no accessory muscle use.   Gastrointestinal: soft, non-tender, nondistended abdomen, no pulsatile mass, no rebound, no guarding  Musculoskeletal: no lower extremity edema, no calf tenderness  Integumentary: warm, dry, no rash  Neurologic: awake, alert  Psychiatric: appropriate mood, appropriate affect

## 2024-12-15 NOTE — ED PROVIDER NOTE - OBJECTIVE STATEMENT
Patient is Macedonian-speaking and her daughter Ariadna is translating at the bedside.  49-year-old female with a past medical history of prediabetes on metformin presents to the ED for evaluation of midsternal chest heaviness and shortness of breath x 1 week that worsened today.  Patient reports the chest pain is worse when she inhales and it radiates to her back when she takes a deep breath in.  Patient reports dry cough and runny nose.  Patient reports 2 weeks ago she tested positive for the flu.  Patient reports she flew to [place] and was there from November 18 to November 25.  Patient denies fever, chills, sore throat, neck pain, arm pain, jaw pain, abdominal pain, nausea, vomiting, diarrhea, constipation, urinary symptoms, leg pain, leg swelling,sick contacts, recent surgeries, recent hospitalizations, history of cancer, use of hormones, cigarette smoking, illicit drug use, or family history of CAD.

## 2024-12-16 VITALS
TEMPERATURE: 97 F | HEART RATE: 69 BPM | DIASTOLIC BLOOD PRESSURE: 77 MMHG | SYSTOLIC BLOOD PRESSURE: 114 MMHG | RESPIRATION RATE: 18 BRPM | OXYGEN SATURATION: 100 %

## 2024-12-16 LAB — TROPONIN T, HIGH SENSITIVITY RESULT: <6 NG/L — SIGNIFICANT CHANGE UP (ref 6–13)

## 2025-01-16 PROBLEM — Z00.00 ENCOUNTER FOR PREVENTIVE HEALTH EXAMINATION: Status: ACTIVE | Noted: 2025-01-16

## 2025-01-22 ENCOUNTER — APPOINTMENT (OUTPATIENT)
Dept: CARDIOLOGY | Facility: CLINIC | Age: 50
End: 2025-01-22
Payer: MEDICAID

## 2025-01-22 VITALS
HEART RATE: 67 BPM | DIASTOLIC BLOOD PRESSURE: 78 MMHG | TEMPERATURE: 98 F | SYSTOLIC BLOOD PRESSURE: 124 MMHG | HEIGHT: 63 IN | BODY MASS INDEX: 28.7 KG/M2 | WEIGHT: 162 LBS

## 2025-01-22 DIAGNOSIS — E88.810 METABOLIC SYNDROME: ICD-10-CM

## 2025-01-22 DIAGNOSIS — R73.03 PREDIABETES.: ICD-10-CM

## 2025-01-22 DIAGNOSIS — Z78.9 OTHER SPECIFIED HEALTH STATUS: ICD-10-CM

## 2025-01-22 PROCEDURE — 99204 OFFICE O/P NEW MOD 45 MIN: CPT

## 2025-01-22 PROCEDURE — 93000 ELECTROCARDIOGRAM COMPLETE: CPT

## 2025-01-24 PROBLEM — E88.810 METABOLIC SYNDROME: Status: ACTIVE | Noted: 2025-01-24

## 2025-01-24 PROBLEM — Z78.9 CAFFEINE USE: Status: ACTIVE | Noted: 2025-01-22

## 2025-01-24 PROBLEM — Z78.9 NON-SMOKER: Status: ACTIVE | Noted: 2025-01-22

## 2025-01-24 PROBLEM — R73.03 PRE-DIABETES: Status: ACTIVE | Noted: 2025-01-24

## 2025-01-24 PROBLEM — Z78.9 SOCIAL ALCOHOL USE: Status: ACTIVE | Noted: 2025-01-22

## 2025-04-14 ENCOUNTER — APPOINTMENT (OUTPATIENT)
Dept: CARDIOLOGY | Facility: CLINIC | Age: 50
End: 2025-04-14

## 2025-08-29 ENCOUNTER — EMERGENCY (EMERGENCY)
Facility: HOSPITAL | Age: 50
LOS: 0 days | Discharge: HOME | End: 2025-08-30
Attending: EMERGENCY MEDICINE
Payer: MEDICAID

## 2025-08-29 VITALS
RESPIRATION RATE: 16 BRPM | DIASTOLIC BLOOD PRESSURE: 76 MMHG | HEART RATE: 68 BPM | OXYGEN SATURATION: 100 % | SYSTOLIC BLOOD PRESSURE: 108 MMHG

## 2025-08-29 VITALS
OXYGEN SATURATION: 100 % | TEMPERATURE: 98 F | SYSTOLIC BLOOD PRESSURE: 113 MMHG | DIASTOLIC BLOOD PRESSURE: 76 MMHG | HEART RATE: 80 BPM | HEIGHT: 63 IN | RESPIRATION RATE: 16 BRPM | WEIGHT: 149.91 LBS

## 2025-08-29 DIAGNOSIS — R26.81 UNSTEADINESS ON FEET: ICD-10-CM

## 2025-08-29 DIAGNOSIS — R51.9 HEADACHE, UNSPECIFIED: ICD-10-CM

## 2025-08-29 DIAGNOSIS — Z79.84 LONG TERM (CURRENT) USE OF ORAL HYPOGLYCEMIC DRUGS: ICD-10-CM

## 2025-08-29 DIAGNOSIS — M54.2 CERVICALGIA: ICD-10-CM

## 2025-08-29 DIAGNOSIS — R74.01 ELEVATION OF LEVELS OF LIVER TRANSAMINASE LEVELS: ICD-10-CM

## 2025-08-29 DIAGNOSIS — R29.2 ABNORMAL REFLEX: ICD-10-CM

## 2025-08-29 DIAGNOSIS — Z91.148 PATIENT'S OTHER NONCOMPLIANCE WITH MEDICATION REGIMEN FOR OTHER REASON: ICD-10-CM

## 2025-08-29 DIAGNOSIS — E11.65 TYPE 2 DIABETES MELLITUS WITH HYPERGLYCEMIA: ICD-10-CM

## 2025-08-29 DIAGNOSIS — R42 DIZZINESS AND GIDDINESS: ICD-10-CM

## 2025-08-29 DIAGNOSIS — T38.3X6A UNDERDOSING OF INSULIN AND ORAL HYPOGLYCEMIC [ANTIDIABETIC] DRUGS, INITIAL ENCOUNTER: ICD-10-CM

## 2025-08-29 LAB
ALBUMIN SERPL ELPH-MCNC: 4.5 G/DL — SIGNIFICANT CHANGE UP (ref 3.5–5.2)
ALP SERPL-CCNC: 173 U/L — HIGH (ref 30–115)
ALT FLD-CCNC: 220 U/L — HIGH (ref 0–41)
ANION GAP SERPL CALC-SCNC: 13 MMOL/L — SIGNIFICANT CHANGE UP (ref 7–14)
APTT BLD: 31.5 SEC — SIGNIFICANT CHANGE UP (ref 27–39.2)
AST SERPL-CCNC: 181 U/L — HIGH (ref 0–41)
BASOPHILS # BLD AUTO: 0.04 K/UL — SIGNIFICANT CHANGE UP (ref 0–0.2)
BASOPHILS NFR BLD AUTO: 0.5 % — SIGNIFICANT CHANGE UP (ref 0–1)
BILIRUB SERPL-MCNC: 0.4 MG/DL — SIGNIFICANT CHANGE UP (ref 0.2–1.2)
BUN SERPL-MCNC: 10 MG/DL — SIGNIFICANT CHANGE UP (ref 10–20)
CALCIUM SERPL-MCNC: 10 MG/DL — SIGNIFICANT CHANGE UP (ref 8.4–10.5)
CHLORIDE SERPL-SCNC: 98 MMOL/L — SIGNIFICANT CHANGE UP (ref 98–110)
CO2 SERPL-SCNC: 28 MMOL/L — SIGNIFICANT CHANGE UP (ref 17–32)
CREAT SERPL-MCNC: 0.7 MG/DL — SIGNIFICANT CHANGE UP (ref 0.7–1.5)
EGFR: 105 ML/MIN/1.73M2 — SIGNIFICANT CHANGE UP
EGFR: 105 ML/MIN/1.73M2 — SIGNIFICANT CHANGE UP
EOSINOPHIL # BLD AUTO: 0.13 K/UL — SIGNIFICANT CHANGE UP (ref 0–0.7)
EOSINOPHIL NFR BLD AUTO: 1.6 % — SIGNIFICANT CHANGE UP (ref 0–8)
GLUCOSE SERPL-MCNC: 328 MG/DL — HIGH (ref 70–99)
HCG SERPL QL: NEGATIVE — SIGNIFICANT CHANGE UP
HCT VFR BLD CALC: 45.7 % — SIGNIFICANT CHANGE UP (ref 37–47)
HGB BLD-MCNC: 15.5 G/DL — SIGNIFICANT CHANGE UP (ref 12–16)
IMM GRANULOCYTES NFR BLD AUTO: 0.5 % — HIGH (ref 0.1–0.3)
INR BLD: 1.05 RATIO — SIGNIFICANT CHANGE UP (ref 0.65–1.3)
LYMPHOCYTES # BLD AUTO: 2.19 K/UL — SIGNIFICANT CHANGE UP (ref 1.2–3.4)
LYMPHOCYTES # BLD AUTO: 26.2 % — SIGNIFICANT CHANGE UP (ref 20.5–51.1)
MCHC RBC-ENTMCNC: 30 PG — SIGNIFICANT CHANGE UP (ref 27–31)
MCHC RBC-ENTMCNC: 33.9 G/DL — SIGNIFICANT CHANGE UP (ref 32–37)
MCV RBC AUTO: 88.4 FL — SIGNIFICANT CHANGE UP (ref 81–99)
MONOCYTES # BLD AUTO: 0.55 K/UL — SIGNIFICANT CHANGE UP (ref 0.1–0.6)
MONOCYTES NFR BLD AUTO: 6.6 % — SIGNIFICANT CHANGE UP (ref 1.7–9.3)
NEUTROPHILS # BLD AUTO: 5.42 K/UL — SIGNIFICANT CHANGE UP (ref 1.4–6.5)
NEUTROPHILS NFR BLD AUTO: 64.6 % — SIGNIFICANT CHANGE UP (ref 42.2–75.2)
NRBC BLD AUTO-RTO: 0 /100 WBCS — SIGNIFICANT CHANGE UP (ref 0–0)
PLATELET # BLD AUTO: 270 K/UL — SIGNIFICANT CHANGE UP (ref 130–400)
PMV BLD: 12 FL — HIGH (ref 7.4–10.4)
POTASSIUM SERPL-MCNC: 4.7 MMOL/L — SIGNIFICANT CHANGE UP (ref 3.5–5)
POTASSIUM SERPL-SCNC: 4.7 MMOL/L — SIGNIFICANT CHANGE UP (ref 3.5–5)
PROT SERPL-MCNC: 7.3 G/DL — SIGNIFICANT CHANGE UP (ref 6–8)
PROTHROM AB SERPL-ACNC: 12.4 SEC — SIGNIFICANT CHANGE UP (ref 9.95–12.87)
RBC # BLD: 5.17 M/UL — SIGNIFICANT CHANGE UP (ref 4.2–5.4)
RBC # FLD: 12.6 % — SIGNIFICANT CHANGE UP (ref 11.5–14.5)
SODIUM SERPL-SCNC: 139 MMOL/L — SIGNIFICANT CHANGE UP (ref 135–146)
WBC # BLD: 8.37 K/UL — SIGNIFICANT CHANGE UP (ref 4.8–10.8)
WBC # FLD AUTO: 8.37 K/UL — SIGNIFICANT CHANGE UP (ref 4.8–10.8)

## 2025-08-29 PROCEDURE — 93010 ELECTROCARDIOGRAM REPORT: CPT

## 2025-08-29 PROCEDURE — 71046 X-RAY EXAM CHEST 2 VIEWS: CPT

## 2025-08-29 PROCEDURE — 93005 ELECTROCARDIOGRAM TRACING: CPT

## 2025-08-29 PROCEDURE — 70450 CT HEAD/BRAIN W/O DYE: CPT | Mod: 26,59

## 2025-08-29 PROCEDURE — 72125 CT NECK SPINE W/O DYE: CPT

## 2025-08-29 PROCEDURE — 70498 CT ANGIOGRAPHY NECK: CPT

## 2025-08-29 PROCEDURE — 82962 GLUCOSE BLOOD TEST: CPT

## 2025-08-29 PROCEDURE — 96375 TX/PRO/DX INJ NEW DRUG ADDON: CPT | Mod: XU

## 2025-08-29 PROCEDURE — 99285 EMERGENCY DEPT VISIT HI MDM: CPT | Mod: 25

## 2025-08-29 PROCEDURE — 70450 CT HEAD/BRAIN W/O DYE: CPT

## 2025-08-29 PROCEDURE — 85610 PROTHROMBIN TIME: CPT

## 2025-08-29 PROCEDURE — T1013: CPT

## 2025-08-29 PROCEDURE — 85025 COMPLETE CBC W/AUTO DIFF WBC: CPT

## 2025-08-29 PROCEDURE — 99285 EMERGENCY DEPT VISIT HI MDM: CPT

## 2025-08-29 PROCEDURE — 72125 CT NECK SPINE W/O DYE: CPT | Mod: 26

## 2025-08-29 PROCEDURE — 70496 CT ANGIOGRAPHY HEAD: CPT | Mod: 26

## 2025-08-29 PROCEDURE — 84703 CHORIONIC GONADOTROPIN ASSAY: CPT

## 2025-08-29 PROCEDURE — 96374 THER/PROPH/DIAG INJ IV PUSH: CPT | Mod: XU

## 2025-08-29 PROCEDURE — 85730 THROMBOPLASTIN TIME PARTIAL: CPT

## 2025-08-29 PROCEDURE — 71046 X-RAY EXAM CHEST 2 VIEWS: CPT | Mod: 26

## 2025-08-29 PROCEDURE — 70498 CT ANGIOGRAPHY NECK: CPT | Mod: 26

## 2025-08-29 PROCEDURE — 70496 CT ANGIOGRAPHY HEAD: CPT

## 2025-08-29 PROCEDURE — 36415 COLL VENOUS BLD VENIPUNCTURE: CPT

## 2025-08-29 PROCEDURE — 80053 COMPREHEN METABOLIC PANEL: CPT

## 2025-08-29 RX ORDER — METHOCARBAMOL 500 MG/1
1500 TABLET, FILM COATED ORAL ONCE
Refills: 0 | Status: COMPLETED | OUTPATIENT
Start: 2025-08-29 | End: 2025-08-29

## 2025-08-29 RX ORDER — MAGNESIUM OXIDE 400 MG
400 TABLET ORAL ONCE
Refills: 0 | Status: COMPLETED | OUTPATIENT
Start: 2025-08-29 | End: 2025-08-29

## 2025-08-29 RX ORDER — KETOROLAC TROMETHAMINE 30 MG/ML
15 INJECTION, SOLUTION INTRAMUSCULAR; INTRAVENOUS ONCE
Refills: 0 | Status: DISCONTINUED | OUTPATIENT
Start: 2025-08-29 | End: 2025-08-29

## 2025-08-29 RX ORDER — SODIUM CHLORIDE 9 G/1000ML
1000 INJECTION, SOLUTION INTRAVENOUS ONCE
Refills: 0 | Status: COMPLETED | OUTPATIENT
Start: 2025-08-29 | End: 2025-08-29

## 2025-08-29 RX ORDER — LIDOCAINE HYDROCHLORIDE 20 MG/ML
1 JELLY TOPICAL ONCE
Refills: 0 | Status: COMPLETED | OUTPATIENT
Start: 2025-08-29 | End: 2025-08-29

## 2025-08-29 RX ORDER — MECLIZINE HCL 12.5 MG
50 TABLET ORAL ONCE
Refills: 0 | Status: COMPLETED | OUTPATIENT
Start: 2025-08-29 | End: 2025-08-29

## 2025-08-29 RX ORDER — METOCLOPRAMIDE HCL 10 MG
10 TABLET ORAL ONCE
Refills: 0 | Status: COMPLETED | OUTPATIENT
Start: 2025-08-29 | End: 2025-08-29

## 2025-08-29 RX ORDER — ACETAMINOPHEN 500 MG/5ML
975 LIQUID (ML) ORAL ONCE
Refills: 0 | Status: COMPLETED | OUTPATIENT
Start: 2025-08-29 | End: 2025-08-29

## 2025-08-29 RX ADMIN — METHOCARBAMOL 1500 MILLIGRAM(S): 500 TABLET, FILM COATED ORAL at 17:27

## 2025-08-29 RX ADMIN — Medication 104 MILLIGRAM(S): at 17:27

## 2025-08-29 RX ADMIN — LIDOCAINE HYDROCHLORIDE 1 PATCH: 20 JELLY TOPICAL at 17:27

## 2025-08-29 RX ADMIN — Medication 50 MILLIGRAM(S): at 17:27

## 2025-08-29 RX ADMIN — Medication 975 MILLIGRAM(S): at 17:27

## 2025-08-29 RX ADMIN — KETOROLAC TROMETHAMINE 15 MILLIGRAM(S): 30 INJECTION, SOLUTION INTRAMUSCULAR; INTRAVENOUS at 20:28

## 2025-08-29 RX ADMIN — SODIUM CHLORIDE 1000 MILLILITER(S): 9 INJECTION, SOLUTION INTRAVENOUS at 23:04

## 2025-08-30 RX ORDER — METFORMIN HYDROCHLORIDE 850 MG/1
1 TABLET ORAL
Qty: 30 | Refills: 0
Start: 2025-08-30 | End: 2025-09-28

## 2025-08-30 RX ADMIN — Medication 400 MILLIGRAM(S): at 00:04
